# Patient Record
Sex: FEMALE | Race: BLACK OR AFRICAN AMERICAN | NOT HISPANIC OR LATINO | URBAN - NONMETROPOLITAN AREA
[De-identification: names, ages, dates, MRNs, and addresses within clinical notes are randomized per-mention and may not be internally consistent; named-entity substitution may affect disease eponyms.]

---

## 2017-04-13 NOTE — PATIENT DISCUSSION
BLEPHARITIS, OU: PRESCRIBE WARM COMPRESSES AND EYELID SCRUBS QD-BID, ARTIFICIAL TEARS BID-QID, AND ERYTHROMYCIN OPHTHALMIC OINTMENT EVERY NIGHT AS NEEDED. RETURN FOR FOLLOW-UP AS SCHEDULED.

## 2019-05-13 NOTE — PATIENT DISCUSSION
NO OCULAR FINDINGS WHICH CORRELATE TO VISION CHANGES. MILD DRYNESS- DOES NOT ACCOUNT FOR VISION. ONH AND MACULA WNL. WILL SEND BACK TO PCP/NEUROLOGIST FOR FURTHER EVAL. VISION CHANGES MAY BE RELATED TO BRAIN NOT EYE.

## 2022-08-16 ENCOUNTER — COMPREHENSIVE EXAM (OUTPATIENT)
Dept: URBAN - NONMETROPOLITAN AREA CLINIC 6 | Facility: CLINIC | Age: 77
End: 2022-08-16

## 2022-08-16 DIAGNOSIS — H25.13: ICD-10-CM

## 2022-08-16 DIAGNOSIS — H40.1132: ICD-10-CM

## 2022-08-16 PROCEDURE — 92014 COMPRE OPH EXAM EST PT 1/>: CPT

## 2022-08-16 PROCEDURE — 92133 CPTRZD OPH DX IMG PST SGM ON: CPT

## 2022-08-16 PROCEDURE — 92015 DETERMINE REFRACTIVE STATE: CPT

## 2022-08-16 RX ORDER — LATANOPROST 50 UG/ML: 1 SOLUTION/ DROPS OPHTHALMIC EVERY EVENING

## 2022-08-16 ASSESSMENT — VISUAL ACUITY
OS_SC: 20/80
OU_SC: 20/60
OD_SC: 20/60-1

## 2022-08-16 ASSESSMENT — TONOMETRY
OS_IOP_MMHG: 27
OD_IOP_MMHG: 25

## 2022-08-30 NOTE — PATIENT DISCUSSION
Discussed the use of warm compresses and digital massage. Prescribed Doxycycline 50mg 14 tabs PO BID x 7 days. Return if symptoms do not improve or worsen.

## 2022-09-19 ENCOUNTER — PRE-OP/H&P (OUTPATIENT)
Dept: URBAN - NONMETROPOLITAN AREA CLINIC 6 | Facility: CLINIC | Age: 77
End: 2022-09-19

## 2022-09-19 DIAGNOSIS — H40.1132: ICD-10-CM

## 2022-09-19 DIAGNOSIS — H25.13: ICD-10-CM

## 2022-09-19 PROCEDURE — 92136 OPHTHALMIC BIOMETRY: CPT

## 2022-09-19 PROCEDURE — 92020 GONIOSCOPY: CPT

## 2022-09-19 ASSESSMENT — TONOMETRY
OS_IOP_MMHG: 21
OD_IOP_MMHG: 21

## 2022-10-06 ENCOUNTER — POST-OP (OUTPATIENT)
Dept: URBAN - NONMETROPOLITAN AREA CLINIC 6 | Facility: CLINIC | Age: 77
End: 2022-10-06

## 2022-10-06 DIAGNOSIS — Z96.1: ICD-10-CM

## 2022-10-06 PROCEDURE — 99024 POSTOP FOLLOW-UP VISIT: CPT

## 2022-10-06 ASSESSMENT — VISUAL ACUITY: OD_SC: 20/40-2

## 2022-10-06 ASSESSMENT — TONOMETRY: OD_IOP_MMHG: 16

## 2022-10-10 ENCOUNTER — POST-OP (OUTPATIENT)
Dept: URBAN - NONMETROPOLITAN AREA CLINIC 6 | Facility: CLINIC | Age: 77
End: 2022-10-10

## 2022-10-10 DIAGNOSIS — Z96.1: ICD-10-CM

## 2022-10-10 DIAGNOSIS — H25.12: ICD-10-CM

## 2022-10-10 PROCEDURE — 92136 OPHTHALMIC BIOMETRY: CPT

## 2022-10-10 PROCEDURE — 99024 POSTOP FOLLOW-UP VISIT: CPT

## 2022-10-10 ASSESSMENT — KERATOMETRY
OD_K2POWER_DIOPTERS: 42.50
OD_AXISANGLE2_DEGREES: 173
OD_AXISANGLE_DEGREES: 83
OD_K1POWER_DIOPTERS: 41.25

## 2022-10-10 ASSESSMENT — VISUAL ACUITY: OD_SC: 20/40-1

## 2022-10-10 ASSESSMENT — TONOMETRY: OD_IOP_MMHG: 18

## 2022-10-20 ENCOUNTER — POST-OP (OUTPATIENT)
Dept: URBAN - NONMETROPOLITAN AREA CLINIC 6 | Facility: CLINIC | Age: 77
End: 2022-10-20

## 2022-10-20 DIAGNOSIS — Z96.1: ICD-10-CM

## 2022-10-20 PROCEDURE — 99024 POSTOP FOLLOW-UP VISIT: CPT

## 2022-10-20 ASSESSMENT — TONOMETRY
OS_IOP_MMHG: 21
OD_IOP_MMHG: 17

## 2022-10-20 ASSESSMENT — VISUAL ACUITY: OS_SC: 20/50-1

## 2022-10-28 ENCOUNTER — POST-OP (OUTPATIENT)
Dept: URBAN - NONMETROPOLITAN AREA CLINIC 6 | Facility: CLINIC | Age: 77
End: 2022-10-28

## 2022-10-28 DIAGNOSIS — Z96.1: ICD-10-CM

## 2022-10-28 PROCEDURE — 99024 POSTOP FOLLOW-UP VISIT: CPT

## 2022-10-28 ASSESSMENT — TONOMETRY
OD_IOP_MMHG: 18
OS_IOP_MMHG: 18

## 2022-10-28 ASSESSMENT — KERATOMETRY
OD_K2POWER_DIOPTERS: 42.25
OS_AXISANGLE_DEGREES: 90
OS_K2POWER_DIOPTERS: 43.25
OD_AXISANGLE_DEGREES: 89
OD_K1POWER_DIOPTERS: 41.00
OS_K1POWER_DIOPTERS: 41.50
OD_AXISANGLE2_DEGREES: 179
OS_AXISANGLE2_DEGREES: 180

## 2022-10-28 ASSESSMENT — VISUAL ACUITY
OD_SC: 20/100
OS_SC: 20/70

## 2022-11-17 ENCOUNTER — POST-OP (OUTPATIENT)
Dept: URBAN - NONMETROPOLITAN AREA CLINIC 6 | Facility: CLINIC | Age: 77
End: 2022-11-17

## 2022-11-17 DIAGNOSIS — Z96.1: ICD-10-CM

## 2022-11-17 PROCEDURE — 99024 POSTOP FOLLOW-UP VISIT: CPT

## 2022-11-17 RX ORDER — PREDNISOLONE ACETATE 10 MG/ML: 1 SUSPENSION/ DROPS OPHTHALMIC

## 2022-11-17 ASSESSMENT — TONOMETRY
OD_IOP_MMHG: 22
OS_IOP_MMHG: 26
OD_IOP_MMHG: 21
OS_IOP_MMHG: 26

## 2022-11-17 ASSESSMENT — VISUAL ACUITY
OD_SC: 20/50
OS_SC: 20/200

## 2022-11-21 ENCOUNTER — POST-OP (OUTPATIENT)
Dept: URBAN - NONMETROPOLITAN AREA CLINIC 6 | Facility: CLINIC | Age: 77
End: 2022-11-21

## 2022-11-21 DIAGNOSIS — H20.9: ICD-10-CM

## 2022-11-21 PROCEDURE — 99024 POSTOP FOLLOW-UP VISIT: CPT

## 2022-11-21 ASSESSMENT — VISUAL ACUITY
OU_SC: 20/40-1
OS_SC: 20/40-2
OD_SC: 20/50-1

## 2022-11-21 ASSESSMENT — TONOMETRY
OD_IOP_MMHG: 18
OS_IOP_MMHG: 32

## 2022-12-08 ENCOUNTER — POST-OP (OUTPATIENT)
Dept: URBAN - NONMETROPOLITAN AREA CLINIC 6 | Facility: CLINIC | Age: 77
End: 2022-12-08

## 2022-12-08 PROCEDURE — 99024 POSTOP FOLLOW-UP VISIT: CPT

## 2022-12-08 ASSESSMENT — TONOMETRY
OS_IOP_MMHG: 21
OD_IOP_MMHG: 18

## 2022-12-08 ASSESSMENT — VISUAL ACUITY
OU_SC: 20/50-1
OS_SC: 20/70-1
OD_SC: 20/50-1

## 2023-01-03 ENCOUNTER — POST-OP (OUTPATIENT)
Dept: URBAN - NONMETROPOLITAN AREA CLINIC 6 | Facility: CLINIC | Age: 78
End: 2023-01-03

## 2023-01-03 DIAGNOSIS — H20.9: ICD-10-CM

## 2023-01-03 PROCEDURE — 99024 POSTOP FOLLOW-UP VISIT: CPT

## 2023-01-03 ASSESSMENT — TONOMETRY
OS_IOP_MMHG: 24
OD_IOP_MMHG: 16

## 2023-01-03 ASSESSMENT — VISUAL ACUITY
OD_SC: 20/50-1
OS_SC: 20/50
OU_SC: 20/40-2

## 2023-05-08 ENCOUNTER — FOLLOW UP (OUTPATIENT)
Dept: URBAN - NONMETROPOLITAN AREA CLINIC 6 | Facility: CLINIC | Age: 78
End: 2023-05-08

## 2023-05-08 DIAGNOSIS — H40.1132: ICD-10-CM

## 2023-05-08 PROCEDURE — 99213 OFFICE O/P EST LOW 20 MIN: CPT

## 2023-05-08 ASSESSMENT — VISUAL ACUITY
OD_SC: 20/50-1
OS_SC: 20/70-1
OU_SC: 20/40

## 2023-05-08 ASSESSMENT — TONOMETRY
OD_IOP_MMHG: 16
OS_IOP_MMHG: 17

## 2023-05-17 ENCOUNTER — TECH ONLY (OUTPATIENT)
Dept: URBAN - NONMETROPOLITAN AREA CLINIC 6 | Facility: CLINIC | Age: 78
End: 2023-05-17

## 2023-05-17 ASSESSMENT — TONOMETRY
OS_IOP_MMHG: 16
OD_IOP_MMHG: 14

## 2023-08-24 ENCOUNTER — COMPREHENSIVE EXAM (OUTPATIENT)
Dept: URBAN - NONMETROPOLITAN AREA CLINIC 6 | Facility: CLINIC | Age: 78
End: 2023-08-24

## 2023-08-24 DIAGNOSIS — H26.493: ICD-10-CM

## 2023-08-24 DIAGNOSIS — H40.1132: ICD-10-CM

## 2023-08-24 PROCEDURE — 92133 CPTRZD OPH DX IMG PST SGM ON: CPT

## 2023-08-24 PROCEDURE — 66821 AFTER CATARACT LASER SURGERY: CPT

## 2023-08-24 PROCEDURE — 92014 COMPRE OPH EXAM EST PT 1/>: CPT

## 2023-08-24 ASSESSMENT — TONOMETRY
OD_IOP_MMHG: 21
OS_IOP_MMHG: 19

## 2023-08-24 ASSESSMENT — KERATOMETRY
OD_AXISANGLE2_DEGREES: 172
OS_AXISANGLE_DEGREES: 100
OS_K1POWER_DIOPTERS: 41.50
OD_K2POWER_DIOPTERS: 42.75
OD_AXISANGLE_DEGREES: 82
OS_K2POWER_DIOPTERS: 43.50
OD_K1POWER_DIOPTERS: 41.50
OS_AXISANGLE2_DEGREES: 10

## 2023-08-24 ASSESSMENT — VISUAL ACUITY
OU_SC: 20/50-2
OD_SC: 20/70-1
OS_SC: 20/70-1

## 2023-09-11 ENCOUNTER — CLINIC PROCEDURE ONLY (OUTPATIENT)
Dept: URBAN - NONMETROPOLITAN AREA CLINIC 6 | Facility: CLINIC | Age: 78
End: 2023-09-11

## 2023-09-11 DIAGNOSIS — H26.491: ICD-10-CM

## 2023-09-11 PROCEDURE — 66821 AFTER CATARACT LASER SURGERY: CPT

## 2023-09-11 ASSESSMENT — VISUAL ACUITY
OS_SC: 20/60
OU_SC: 20/60
OD_SC: 20/70

## 2023-09-11 ASSESSMENT — TONOMETRY
OD_IOP_MMHG: 20
OS_IOP_MMHG: 20

## 2023-09-25 ENCOUNTER — CLINIC PROCEDURE ONLY (OUTPATIENT)
Dept: URBAN - NONMETROPOLITAN AREA CLINIC 6 | Facility: CLINIC | Age: 78
End: 2023-09-25

## 2023-09-25 DIAGNOSIS — Z98.890: ICD-10-CM

## 2023-09-25 PROCEDURE — 99024 POSTOP FOLLOW-UP VISIT: CPT

## 2023-09-25 ASSESSMENT — KERATOMETRY
OD_AXISANGLE_DEGREES: 80
OS_AXISANGLE_DEGREES: 102
OS_AXISANGLE2_DEGREES: 12
OD_AXISANGLE2_DEGREES: 170
OS_K1POWER_DIOPTERS: 42.25
OS_K2POWER_DIOPTERS: 43.50
OD_K1POWER_DIOPTERS: 41.50
OD_K2POWER_DIOPTERS: 42.75

## 2023-09-25 ASSESSMENT — TONOMETRY
OD_IOP_MMHG: 16
OS_IOP_MMHG: 18

## 2023-09-25 ASSESSMENT — VISUAL ACUITY
OS_SC: 20/70-1
OU_SC: 20/50-1
OU_SC: J1-2
OD_SC: 20/60-1

## 2023-12-28 ENCOUNTER — FOLLOW UP (OUTPATIENT)
Dept: URBAN - NONMETROPOLITAN AREA CLINIC 6 | Facility: CLINIC | Age: 78
End: 2023-12-28

## 2023-12-28 DIAGNOSIS — H04.123: ICD-10-CM

## 2023-12-28 DIAGNOSIS — H40.1132: ICD-10-CM

## 2023-12-28 PROCEDURE — 92012 INTRM OPH EXAM EST PATIENT: CPT

## 2023-12-28 ASSESSMENT — VISUAL ACUITY
OU_SC: 20/60-1
OD_SC: 20/70
OS_SC: 20/50-1

## 2023-12-28 ASSESSMENT — TONOMETRY
OD_IOP_MMHG: 18
OS_IOP_MMHG: 18

## 2023-12-28 ASSESSMENT — KERATOMETRY
OD_AXISANGLE2_DEGREES: 170
OS_K1POWER_DIOPTERS: 42.25
OD_K2POWER_DIOPTERS: 42.75
OD_K1POWER_DIOPTERS: 41.50
OS_AXISANGLE_DEGREES: 102
OS_AXISANGLE2_DEGREES: 12
OD_AXISANGLE_DEGREES: 80
OS_K2POWER_DIOPTERS: 43.50

## 2024-04-29 ENCOUNTER — FOLLOW UP (OUTPATIENT)
Dept: URBAN - NONMETROPOLITAN AREA CLINIC 6 | Facility: CLINIC | Age: 79
End: 2024-04-29

## 2024-04-29 DIAGNOSIS — H40.1132: ICD-10-CM

## 2024-04-29 PROCEDURE — 99213 OFFICE O/P EST LOW 20 MIN: CPT

## 2024-04-29 ASSESSMENT — TONOMETRY
OD_IOP_MMHG: 16
OS_IOP_MMHG: 18

## 2024-04-29 ASSESSMENT — VISUAL ACUITY
OS_SC: 20/60
OU_SC: 20/60
OD_SC: 20/60+1

## 2024-05-24 ENCOUNTER — DIAGNOSTICS ONLY (OUTPATIENT)
Dept: URBAN - NONMETROPOLITAN AREA CLINIC 6 | Facility: CLINIC | Age: 79
End: 2024-05-24

## 2024-05-24 DIAGNOSIS — H40.1132: ICD-10-CM

## 2024-05-24 PROCEDURE — 92250 FUNDUS PHOTOGRAPHY W/I&R: CPT

## 2024-05-24 PROCEDURE — 92083 EXTENDED VISUAL FIELD XM: CPT

## 2024-05-24 ASSESSMENT — TONOMETRY
OD_IOP_MMHG: 17
OS_IOP_MMHG: 20

## 2025-08-14 ENCOUNTER — COMPREHENSIVE EXAM (OUTPATIENT)
Age: 80
End: 2025-08-14

## 2025-08-14 DIAGNOSIS — H40.1132: ICD-10-CM

## 2025-08-14 DIAGNOSIS — H04.123: ICD-10-CM

## 2025-08-14 DIAGNOSIS — H02.835: ICD-10-CM

## 2025-08-14 DIAGNOSIS — H02.831: ICD-10-CM

## 2025-08-14 DIAGNOSIS — H02.832: ICD-10-CM

## 2025-08-14 DIAGNOSIS — H52.223: ICD-10-CM

## 2025-08-14 DIAGNOSIS — H02.834: ICD-10-CM

## 2025-08-14 PROCEDURE — 92133 CPTRZD OPH DX IMG PST SGM ON: CPT

## 2025-08-14 PROCEDURE — 92014 COMPRE OPH EXAM EST PT 1/>: CPT

## 2025-08-14 PROCEDURE — 92015 DETERMINE REFRACTIVE STATE: CPT
